# Patient Record
Sex: MALE | Race: WHITE | NOT HISPANIC OR LATINO | Employment: FULL TIME | URBAN - METROPOLITAN AREA
[De-identification: names, ages, dates, MRNs, and addresses within clinical notes are randomized per-mention and may not be internally consistent; named-entity substitution may affect disease eponyms.]

---

## 2024-03-05 NOTE — PROGRESS NOTES
Subjective     Dorian Jerez is a 63 y.o. male with a complex 2.0cm R renal cyst who presents as a new patient seeking 2nd opinion.     He has SOB. He is able to walk a block but has difficulty with stairs.     He has seen urologists and medical oncologists at [facility] for this mass who recommended active surveillance. He is seeking 2nd opinion now that he has Medicare. He has seen Dr. Greenfield and Dr. Thomas.   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     Imaging shows a complex renal cyst on the posterior and inferior aspect of the R kidney. It is mostly endophytic and graded as a Bosniak 4. This is difficult to see on some of the sequences of the MRI.     Mass was found incidentally on imaging done 10/2023 for rectal bleeding. His next imaging is scheduled 5/2024.     PMHx:  hypothyroidism, HTN,     PSHx:  None listed    Social: Tobacco Use: Never smoker    Family: Cancer-related family history is not on file.              Review of Systems   All other systems reviewed and are negative.      Objective   Physical Exam  Gen: No acute distress     Psych: Alert and oriented x3     Neuro:  Normal ROM    Resp: Nonlabored respirations     CV: Regular rate and rhythm     Abd: S, NT, ND.     : Deferred    Skin: Warm, dry and intact without rashes     Lymphatics: No peripheral edema       Assessment/Plan   Problem List Items Addressed This Visit             ICD-10-CM    Complex renal cyst - Primary N28.1     I personally reviewed his imaging and we discussed this in detail. Imaging shows a complex renal cyst on the posterior and inferior aspect of the R kidney. It is mostly endophytic and graded as a Bosniak 4. This is difficult to see on MRI.   Bosniak I cysts are simple and fluid filled. Bosniak II cysts are slight septations. Bosniak III have divisions that are calcified and thicker. Bosniak VI cysts have septations and a solid component.     I discussed the patients presentation, history and imaging results in detail  with the patient today.    We went over the treatment options including:    Active surveillance  Renal mass biopsy  Renal mass biopsy and ablative therapy  Surgical therapy    I explained that biopsy or ablative therapy is associated with increased risk of bursting this cystic mass.   In his case, I would recommend active surveillance given the small size. I would suggest repeat imaging on a 6 month basis to monitor growth rate. If he is ultimately decided on doing active treatment, I would recommend surgical therapy with a partial nephrectomy.     I explained to the patient the risks of surgery including bleeding requiring transfusion, infection, injury to surrounding structures (colon, small intestine, spleen, liver and pancreas), recurrence, need for further procedures, wound infection, failure to cure, need for further procedures and cardiopulmonary events.  I mentioned that the likely recovery period was 4-6 weeks with the initial LOS 1-2 days.     The patient elects to move forward with active surveillance. His next imaging is scheduled at [facility] 5/2024. If he is interested in receiving care at , I would be happy to see him thereafter. The patient understands and agrees with this plan.                    Plan:  FUV after CT imaging/MRI imaging done at [facility] in surveillance.          Scribe Attestation  By signing my name below, I, Katelyn Casalla, Scribe   attest that this documentation has been prepared under the direction and in the presence of Rian Almaraz MD MPH.

## 2024-03-08 ENCOUNTER — OFFICE VISIT (OUTPATIENT)
Dept: UROLOGY | Facility: CLINIC | Age: 64
End: 2024-03-08
Payer: COMMERCIAL

## 2024-03-08 VITALS — HEIGHT: 69 IN | HEART RATE: 80 BPM | SYSTOLIC BLOOD PRESSURE: 130 MMHG | DIASTOLIC BLOOD PRESSURE: 78 MMHG

## 2024-03-08 DIAGNOSIS — N28.1 COMPLEX RENAL CYST: Primary | ICD-10-CM

## 2024-03-08 PROCEDURE — 99205 OFFICE O/P NEW HI 60 MIN: CPT | Performed by: STUDENT IN AN ORGANIZED HEALTH CARE EDUCATION/TRAINING PROGRAM

## 2024-03-08 PROCEDURE — 1036F TOBACCO NON-USER: CPT | Performed by: STUDENT IN AN ORGANIZED HEALTH CARE EDUCATION/TRAINING PROGRAM

## 2024-03-08 PROCEDURE — 1159F MED LIST DOCD IN RCRD: CPT | Performed by: STUDENT IN AN ORGANIZED HEALTH CARE EDUCATION/TRAINING PROGRAM

## 2024-03-08 RX ORDER — TELMISARTAN 80 MG/1
80 TABLET ORAL
COMMUNITY
Start: 2022-05-23 | End: 2024-04-10

## 2024-03-08 RX ORDER — EVOLOCUMAB 140 MG/ML
140 INJECTION, SOLUTION SUBCUTANEOUS
COMMUNITY
Start: 2021-12-29

## 2024-03-08 RX ORDER — LEVOTHYROXINE SODIUM 75 UG/1
75 TABLET ORAL
COMMUNITY
Start: 2022-05-11

## 2024-03-08 RX ORDER — AMLODIPINE BESYLATE 10 MG/1
10 TABLET ORAL
COMMUNITY
Start: 2022-05-31

## 2024-03-08 RX ORDER — ICOSAPENT ETHYL 1 G/1
2 CAPSULE ORAL 2 TIMES DAILY
COMMUNITY
Start: 2022-05-13